# Patient Record
Sex: FEMALE | Race: WHITE | NOT HISPANIC OR LATINO | Employment: FULL TIME | URBAN - METROPOLITAN AREA
[De-identification: names, ages, dates, MRNs, and addresses within clinical notes are randomized per-mention and may not be internally consistent; named-entity substitution may affect disease eponyms.]

---

## 2023-08-15 ENCOUNTER — OFFICE VISIT (OUTPATIENT)
Dept: PRIMARY CARE | Facility: CLINIC | Age: 48
End: 2023-08-15
Payer: COMMERCIAL

## 2023-08-15 VITALS
SYSTOLIC BLOOD PRESSURE: 128 MMHG | DIASTOLIC BLOOD PRESSURE: 78 MMHG | WEIGHT: 137 LBS | HEART RATE: 73 BPM | BODY MASS INDEX: 22.62 KG/M2

## 2023-08-15 DIAGNOSIS — G57.02 PIRIFORMIS SYNDROME OF LEFT SIDE: ICD-10-CM

## 2023-08-15 DIAGNOSIS — M25.551 RIGHT HIP PAIN: Primary | ICD-10-CM

## 2023-08-15 PROBLEM — J32.9 RECURRENT SINUS INFECTIONS: Status: ACTIVE | Noted: 2023-08-15

## 2023-08-15 PROBLEM — R30.0 DYSURIA: Status: ACTIVE | Noted: 2023-08-15

## 2023-08-15 PROBLEM — D18.02 CAVERNOUS HEMANGIOMA OF BRAIN (MULTI): Status: ACTIVE | Noted: 2023-08-15

## 2023-08-15 PROBLEM — J34.2 NASAL SEPTAL DEVIATION: Status: ACTIVE | Noted: 2023-08-15

## 2023-08-15 PROBLEM — U07.1 COVID-19: Status: ACTIVE | Noted: 2023-08-15

## 2023-08-15 PROBLEM — R82.81 PYURIA: Status: ACTIVE | Noted: 2023-08-15

## 2023-08-15 PROBLEM — J34.89 NASAL OBSTRUCTION: Status: ACTIVE | Noted: 2023-08-15

## 2023-08-15 PROBLEM — E78.5 HYPERLIPIDEMIA: Status: ACTIVE | Noted: 2023-08-15

## 2023-08-15 PROBLEM — R09.81 NASAL CONGESTION: Status: ACTIVE | Noted: 2023-08-15

## 2023-08-15 PROBLEM — J34.89 NASAL DRAINAGE: Status: ACTIVE | Noted: 2023-08-15

## 2023-08-15 PROBLEM — H53.8 BLURRING, LEFT EYE: Status: ACTIVE | Noted: 2023-08-15

## 2023-08-15 PROBLEM — N30.91 HEMORRHAGIC CYSTITIS: Status: ACTIVE | Noted: 2023-08-15

## 2023-08-15 PROBLEM — J32.9 CHRONIC RHINOSINUSITIS: Status: ACTIVE | Noted: 2023-08-15

## 2023-08-15 PROBLEM — J34.3 HYPERTROPHY OF BOTH INFERIOR NASAL TURBINATES: Status: ACTIVE | Noted: 2023-08-15

## 2023-08-15 PROBLEM — H93.8X9 EAR FULLNESS: Status: ACTIVE | Noted: 2023-08-15

## 2023-08-15 PROBLEM — F41.9 ANXIETY: Status: ACTIVE | Noted: 2023-08-15

## 2023-08-15 PROBLEM — M54.2 NECK PAIN ON RIGHT SIDE: Status: ACTIVE | Noted: 2023-08-15

## 2023-08-15 PROBLEM — R51.9 HEADACHE: Status: ACTIVE | Noted: 2023-08-15

## 2023-08-15 PROBLEM — J35.8 TONSILLOLITH: Status: ACTIVE | Noted: 2023-08-15

## 2023-08-15 PROBLEM — R53.83 FATIGUE: Status: ACTIVE | Noted: 2023-08-15

## 2023-08-15 PROBLEM — J30.9 ALLERGIC RHINITIS: Status: ACTIVE | Noted: 2023-08-15

## 2023-08-15 PROCEDURE — 99214 OFFICE O/P EST MOD 30 MIN: CPT | Performed by: FAMILY MEDICINE

## 2023-08-15 PROCEDURE — 1036F TOBACCO NON-USER: CPT | Performed by: FAMILY MEDICINE

## 2023-08-15 RX ORDER — NORETHINDRONE ACETATE AND ETHINYL ESTRADIOL, ETHINYL ESTRADIOL AND FERROUS FUMARATE 1MG-10(24)
1 KIT ORAL DAILY
COMMUNITY

## 2023-08-15 RX ORDER — TOPIRAMATE 200 MG/1
200 TABLET ORAL 2 TIMES DAILY
COMMUNITY

## 2023-08-15 RX ORDER — SOD CHLOR,BICARB/SQUEEZ BOTTLE
PACKET, WITH RINSE DEVICE NASAL
COMMUNITY
Start: 2022-04-13

## 2023-08-15 RX ORDER — HYDROCORTISONE ACETATE 25 MG/1
SUPPOSITORY RECTAL
COMMUNITY
End: 2023-09-18

## 2023-08-15 RX ORDER — SERTRALINE HYDROCHLORIDE 50 MG/1
50 TABLET, FILM COATED ORAL DAILY
COMMUNITY
End: 2024-01-22

## 2023-08-15 RX ORDER — FLUTICASONE PROPIONATE 50 MCG
SPRAY, SUSPENSION (ML) NASAL
COMMUNITY
Start: 2017-06-16

## 2023-08-15 RX ORDER — CETIRIZINE HYDROCHLORIDE 10 MG/1
1 TABLET ORAL DAILY
COMMUNITY
Start: 2014-03-20

## 2023-08-15 RX ORDER — OMEPRAZOLE 20 MG/1
20 CAPSULE, DELAYED RELEASE ORAL DAILY
COMMUNITY
End: 2023-12-07 | Stop reason: SDUPTHER

## 2023-08-15 RX ORDER — PHENAZOPYRIDINE HYDROCHLORIDE 100 MG/1
TABLET, FILM COATED ORAL
COMMUNITY
Start: 2022-05-20

## 2023-08-15 RX ORDER — CYCLOBENZAPRINE HCL 5 MG
5 TABLET ORAL NIGHTLY PRN
Qty: 30 TABLET | Refills: 2 | Status: SHIPPED | OUTPATIENT
Start: 2023-08-15 | End: 2024-02-19

## 2023-08-15 ASSESSMENT — ENCOUNTER SYMPTOMS
COLOR CHANGE: 0
PALPITATIONS: 0
FATIGUE: 0
BACK PAIN: 0
APPETITE CHANGE: 0
COUGH: 0
FACIAL ASYMMETRY: 0
HEADACHES: 0
DIZZINESS: 0
ARTHRALGIAS: 0
LIGHT-HEADEDNESS: 0
ACTIVITY CHANGE: 0
FEVER: 0
CHEST TIGHTNESS: 0
CHOKING: 0

## 2023-08-15 NOTE — PROGRESS NOTES
Subjective   Patient ID: Lisa Alvarado is a 48 y.o. female who presents for Right hip pain. X May. No injury.     HPI   Patient explains that she has been having some difficulty with some right hip and back pain since May.  She reports that she has been doing a lot of digging in the yard and other things with yard work.  She explains her dog jumped on the spot which which exacerbated the entire situation.    Review of Systems   Constitutional:  Negative for activity change, appetite change, fatigue and fever.   HENT:  Negative for congestion.    Respiratory:  Negative for cough, choking and chest tightness.    Cardiovascular:  Negative for chest pain, palpitations and leg swelling.   Musculoskeletal:  Negative for arthralgias, back pain and gait problem.   Skin:  Negative for color change and pallor.   Neurological:  Negative for dizziness, facial asymmetry, light-headedness and headaches.       Objective   /78 (BP Location: Left arm)   Pulse 73   Wt 62.1 kg (137 lb)   BMI 22.62 kg/m²   BSA Body surface area is 1.69 meters squared.      Physical Exam  Constitutional:       General: She is not in acute distress.     Appearance: Normal appearance. She is not toxic-appearing.   HENT:      Head: Normocephalic.      Right Ear: Tympanic membrane, ear canal and external ear normal.      Left Ear: Tympanic membrane, ear canal and external ear normal.   Eyes:      Conjunctiva/sclera: Conjunctivae normal.      Pupils: Pupils are equal, round, and reactive to light.   Cardiovascular:      Rate and Rhythm: Normal rate and regular rhythm.      Pulses: Normal pulses.      Heart sounds: Normal heart sounds.   Pulmonary:      Effort: No respiratory distress.      Breath sounds: No wheezing, rhonchi or rales.   Musculoskeletal:         General: Swelling and tenderness present.      Cervical back: No tenderness.      Comments: Pain to posterior right hip at PS[facility] there is slight pain with flexion and extension of right hip    Skin:     Findings: No lesion or rash.   Neurological:      General: No focal deficit present.      Mental Status: She is alert and oriented to person, place, and time. Mental status is at baseline.      Gait: Gait normal.   Psychiatric:         Mood and Affect: Mood normal.         Behavior: Behavior normal.         Thought Content: Thought content normal.         Judgment: Judgment normal.       [facility]acy Encounter on 01/14/2023   Component Date Value Ref Range Status    Total Protein 01/14/2023 6.7  6.4 - 8.2 g/dL Final    Albumin 01/14/2023 4.3  3.4 - 5.0 g/dL Final    Alpha 1 01/14/2023 0.3  0.2 - 0.6 g/dL Final    Alpha 2 01/14/2023 0.7  0.4 - 1.1 g/dL Final    Beta 01/14/2023 0.8  0.5 - 1.2 g/dL Final    Gamma 01/14/2023 0.7  0.5 - 1.4 g/dL Final    SPE Interpretation 01/14/2023 NORMAL   Final    Cholesterol 01/14/2023 251 (H)  0 - 199 mg/dL Final    Comment: .      AGE      DESIRABLE   BORDERLINE HIGH   HIGH     0-19 Y     0 - 169       170 - 199     >/= 200    20-24 Y     0 - 189       190 - 224     >/= 225         >24 Y     0 - 199       200 - 239     >/= 240   **All ranges are based on fasting samples. Specific   therapeutic targets will vary based on patient-specific   cardiac risk.  .   Pediatric guidelines reference:Pediatrics 2011, 128(S5).   Adult guidelines reference: NCEP ATPIII Guidelines,     NAOMI 2001, 258:2486-97  .   Venipuncture immediately after or during the    administration of Metamizole may lead to falsely   low results. Testing should be performed immediately   prior to Metamizole dosing.      HDL 01/14/2023 65.1  mg/dL Final    Comment: .      AGE      VERY LOW   LOW     NORMAL    HIGH       0-19 Y       < 35   < 40     40-45     ----    20-24 Y       ----   < 40       >45     ----      >24 Y       ----   < 40     40-60      >60  .      Cholesterol/HDL Ratio 01/14/2023 3.9   Final    Comment: REF VALUES  DESIRABLE  < 3.4  HIGH RISK  > 5.0      LDL 01/14/2023 161 (H)  0 - 99 mg/dL Final     Comment: .                           NEAR      BORD      AGE      DESIRABLE  OPTIMAL    HIGH     HIGH     VERY HIGH     0-19 Y     0 - 109     ---    110-129   >/= 130     ----    20-24 Y     0 - 119     ---    120-159   >/= 160     ----      >24 Y     0 -  99   100-129  130-159   160-189     >/=190  .      VLDL 01/14/2023 25  0 - 40 mg/dL Final    Triglycerides 01/14/2023 123  0 - 149 mg/dL Final    Comment: .      AGE      DESIRABLE   BORDERLINE HIGH   HIGH     VERY HIGH   0 D-90 D    19 - 174         ----         ----        ----  91 D- 9 Y     0 -  74        75 -  99     >/= 100      ----    10-19 Y     0 -  89        90 - 129     >/= 130      ----    20-24 Y     0 - 114       115 - 149     >/= 150      ----         >24 Y     0 - 149       150 - 199    200- 499    >/= 500  .   Venipuncture immediately after or during the    administration of Metamizole may lead to falsely   low results. Testing should be performed immediately   prior to Metamizole dosing.      Magnesium 01/14/2023 1.99  1.60 - 2.40 mg/dL Final    Vitamin D, 25-Hydroxy 01/14/2023 121 (A)  ng/mL Final    Comment: .  DEFICIENCY:         < 20   NG/ML  INSUFFICIENCY:      20-29  NG/ML  SUFFICIENCY:         NG/ML    THIS ASSAY ACCURATELY QUANTIFIES THE SUM OF  VITAMIN D3, 25-HYDROXY AND VIT D2,25-HYDROXY.      WBC 01/14/2023 7.0  4.4 - 11.3 x10E9/L Final    nRBC 01/14/2023 0.0  0.0 - 0.0 /100 WBC Final    RBC 01/14/2023 4.73  4.00 - 5.20 x10E12/L Final    Hemoglobin 01/14/2023 14.1  12.0 - 16.0 g/dL Final    Hematocrit 01/14/2023 45.1  36.0 - 46.0 % Final    MCV 01/14/2023 95  80 - 100 fL Final    MCHC 01/14/2023 31.3 (L)  32.0 - 36.0 g/dL Final    Platelets 01/14/2023 357  150 - 450 x10E9/L Final    RDW 01/14/2023 12.9  11.5 - 14.5 % Final    Neutrophils % 01/14/2023 51.4  40.0 - 80.0 % Final    Immature Granulocytes %, Automated 01/14/2023 0.6  0.0 - 0.9 % Final    Comment:  Immature Granulocyte Count (IG) includes promyelocytes,     myelocytes and metamyelocytes but does not include bands.   Percent differential counts (%) should be interpreted in the   context of the absolute cell counts (cells/L).      Lymphocytes % 01/14/2023 38.6  13.0 - 44.0 % Final    Monocytes % 01/14/2023 6.6  2.0 - 10.0 % Final    Eosinophils % 01/14/2023 1.9  0.0 - 6.0 % Final    Basophils % 01/14/2023 0.9  0.0 - 2.0 % Final    Neutrophils Absolute 01/14/2023 3.59  1.20 - 7.70 x10E9/L Final    Lymphocytes Absolute 01/14/2023 2.69  1.20 - 4.80 x10E9/L Final    Monocytes Absolute 01/14/2023 0.46  0.10 - 1.00 x10E9/L Final    Eosinophils Absolute 01/14/2023 0.13  0.00 - 0.70 x10E9/L Final    Basophils Absolute 01/14/2023 0.06  0.00 - 0.10 x10E9/L Final    Glucose 01/14/2023 77  74 - 99 mg/dL Final    Sodium 01/14/2023 141  136 - 145 mmol/L Final    Potassium 01/14/2023 4.4  3.5 - 5.3 mmol/L Final    Chloride 01/14/2023 111 (H)  98 - 107 mmol/L Final    Bicarbonate 01/14/2023 21  21 - 32 mmol/L Final    Anion Gap 01/14/2023 13  10 - 20 mmol/L Final    Urea Nitrogen 01/14/2023 13  6 - 23 mg/dL Final    Creatinine 01/14/2023 0.91  0.50 - 1.05 mg/dL Final    GFR Female 01/14/2023 78  >90 mL/min/1.73m2 Final    Comment:  CALCULATIONS OF ESTIMATED GFR ARE PERFORMED   USING THE 2021 CKD-EPI STUDY REFIT EQUATION   WITHOUT THE RACE VARIABLE FOR THE IDMS-TRACEABLE   CREATININE METHODS.    https://jasn.asnjournals.org/content/early/2021/09/22/ASN.3876179410      Calcium 01/14/2023 9.4  8.6 - 10.6 mg/dL Final    Albumin 01/14/2023 4.3  3.4 - 5.0 g/dL Final    Alkaline Phosphatase 01/14/2023 33  33 - 110 U/L Final    Total Protein 01/14/2023 6.7  6.4 - 8.2 g/dL Final    AST 01/14/2023 11  9 - 39 U/L Final    Total Bilirubin 01/14/2023 0.5  0.0 - 1.2 mg/dL Final    ALT (SGPT) 01/14/2023 9  7 - 45 U/L Final    Comment:  Patients treated with Sulfasalazine may generate    falsely decreased results for ALT.      TSH 01/14/2023 2.12  0.44 - 3.98 mIU/L Final    Comment:  TSH testing is  performed using different testing    methodology at [facility] than at other    [facility]. Direct result comparisons should    only be made within the same method.      Path Review - Serum Protein Electr* 01/14/2023 [place]   Final    Comment:  By her/his signature above, the Pathologist   listed as making the final interpretation   certifies that she/he has personally reviewed    this case.  ----------------------------------------------       LD 01/14/2023 97  84 - 246 U/L Final    Phosphorus 01/14/2023 3.8  2.5 - 4.9 mg/dL Final    Comment:  The performance characteristics of phosphorus testing in   heparinized plasma have been validated by the individual     laboratory site where testing is performed. Testing    on heparinized plasma is not approved by the FDA;    however, such approval is not necessary.       Current Outpatient Medications on File Prior to Visit   Medication Sig Dispense Refill    cetirizine (ZyrTEC) 10 mg tablet Take 1 tablet (10 mg) by mouth once daily.      fluticasone (Flonase) 50 mcg/actuation nasal spray Administer into affected nostril(s).      hydrocortisone (Anusol-HC) 25 mg suppository INSERT 1 SUPPOSITORY RECTALLY TWICE A DAY      Lo Loestrin Fe 1 mg-10 mcg (24)/10 mcg (2) tablet Take 1 tablet by mouth once daily.      omeprazole (PriLOSEC) 20 mg DR capsule Take 1 capsule (20 mg) by mouth once daily.      phenazopyridine (Pyridium) 100 mg tablet Take by mouth.      sertraline (Zoloft) 50 mg tablet Take 1 tablet (50 mg) by mouth once daily.      sod chloride-sodium bicarb (Sinus Rinse Starter) nasal rinse Administer into affected nostril(s).      topiramate (Topamax) 200 mg tablet Take 1 tablet (200 mg) by mouth 2 times a day.       No current facility-administered medications on file prior to visit.     No images are attached to the encounter.            Assessment/Plan   Diagnoses and all orders for this visit:  Right hip pain  -     XR pelvis 1-2 views;  Future  -     XR lumbar spine 2-3 views; Future  -     cyclobenzaprine (Flexeril) 5 mg tablet; Take 1 tablet (5 mg) by mouth as needed at bedtime for muscle spasms.  Piriformis syndrome of left side  -     XR pelvis 1-2 views; Future  -     XR lumbar spine 2-3 views; Future  -     cyclobenzaprine (Flexeril) 5 mg tablet; Take 1 tablet (5 mg) by mouth as needed at bedtime for muscle spasms.  Other orders  -     XR lumbar spine 2-3 views    1.  Patient have additional x-ray images  2.  Patient will trial Flexeril  3.  Patient to use heat to area 20 minutes on 20 minutes off  4.  Patient to call for questions or concerns

## 2023-08-17 ENCOUNTER — TELEPHONE (OUTPATIENT)
Dept: PRIMARY CARE | Facility: CLINIC | Age: 48
End: 2023-08-17
Payer: COMMERCIAL

## 2023-08-17 NOTE — TELEPHONE ENCOUNTER
Please call patient to see when she is going to be getting her x-rays performed, I have not seen the results

## 2023-08-18 ENCOUNTER — TELEPHONE (OUTPATIENT)
Dept: PRIMARY CARE | Facility: CLINIC | Age: 48
End: 2023-08-18
Payer: COMMERCIAL

## 2023-08-18 NOTE — TELEPHONE ENCOUNTER
Notified patient pelvic x-ray is normal, also has multiple areas of arthritis in her lumbar spine.     Patient wanted recommendation for a general surgeon for Hemorrhoids, Dr Martinez recommended Dr Casiano with  [phone].  Also would like recommendation for ortho specialist. Dr Martinez recommended Dr Saleem ewing.

## 2023-09-17 DIAGNOSIS — Z00.00 ENCOUNTER FOR GENERAL ADULT MEDICAL EXAMINATION WITHOUT ABNORMAL FINDINGS: ICD-10-CM

## 2023-09-18 RX ORDER — HYDROCORTISONE ACETATE 25 MG/1
SUPPOSITORY RECTAL
Qty: 30 SUPPOSITORY | Refills: 11 | Status: SHIPPED | OUTPATIENT
Start: 2023-09-18

## 2023-12-06 DIAGNOSIS — K21.9 GASTROESOPHAGEAL REFLUX DISEASE, UNSPECIFIED WHETHER ESOPHAGITIS PRESENT: Primary | ICD-10-CM

## 2023-12-07 RX ORDER — OMEPRAZOLE 20 MG/1
20 CAPSULE, DELAYED RELEASE ORAL DAILY
Qty: 90 CAPSULE | Refills: 3 | Status: SHIPPED | OUTPATIENT
Start: 2023-12-07

## 2024-01-19 ENCOUNTER — TELEPHONE (OUTPATIENT)
Dept: PRIMARY CARE | Facility: CLINIC | Age: 49
End: 2024-01-19
Payer: COMMERCIAL

## 2024-01-19 DIAGNOSIS — R30.0 DYSURIA: ICD-10-CM

## 2024-01-19 RX ORDER — SULFAMETHOXAZOLE AND TRIMETHOPRIM 800; 160 MG/1; MG/1
1 TABLET ORAL 2 TIMES DAILY
Qty: 14 TABLET | Refills: 0 | Status: SHIPPED | OUTPATIENT
Start: 2024-01-19 | End: 2024-01-26

## 2024-01-19 NOTE — TELEPHONE ENCOUNTER
Patient left a voicemail stating she has a uti, she is at work all day and is unable to leave. Requesting an antibiotic. Possibly Bactrim.

## 2024-01-20 DIAGNOSIS — F41.9 ANXIETY DISORDER, UNSPECIFIED: ICD-10-CM

## 2024-01-22 RX ORDER — SERTRALINE HYDROCHLORIDE 50 MG/1
50 TABLET, FILM COATED ORAL DAILY
Qty: 90 TABLET | Refills: 3 | Status: SHIPPED | OUTPATIENT
Start: 2024-01-22

## 2024-02-18 DIAGNOSIS — M25.551 RIGHT HIP PAIN: ICD-10-CM

## 2024-02-18 DIAGNOSIS — G57.02 PIRIFORMIS SYNDROME OF LEFT SIDE: ICD-10-CM

## 2024-02-19 RX ORDER — CYCLOBENZAPRINE HCL 5 MG
5 TABLET ORAL NIGHTLY PRN
Qty: 30 TABLET | Refills: 2 | Status: SHIPPED | OUTPATIENT
Start: 2024-02-19 | End: 2024-04-19

## 2024-06-12 ENCOUNTER — TELEMEDICINE (OUTPATIENT)
Dept: PRIMARY CARE | Facility: CLINIC | Age: 49
End: 2024-06-12
Payer: COMMERCIAL

## 2024-06-12 ENCOUNTER — TELEPHONE (OUTPATIENT)
Dept: PRIMARY CARE | Facility: CLINIC | Age: 49
End: 2024-06-12

## 2024-06-12 DIAGNOSIS — Z00.00 HEALTHCARE MAINTENANCE: ICD-10-CM

## 2024-06-12 DIAGNOSIS — R53.83 OTHER FATIGUE: ICD-10-CM

## 2024-06-12 DIAGNOSIS — F41.9 ANXIETY: Primary | ICD-10-CM

## 2024-06-12 DIAGNOSIS — M79.10 MYALGIA: ICD-10-CM

## 2024-06-12 DIAGNOSIS — Z12.31 SCREENING MAMMOGRAM FOR BREAST CANCER: ICD-10-CM

## 2024-06-12 PROCEDURE — 1036F TOBACCO NON-USER: CPT | Performed by: FAMILY MEDICINE

## 2024-06-12 PROCEDURE — 99214 OFFICE O/P EST MOD 30 MIN: CPT | Performed by: FAMILY MEDICINE

## 2024-06-12 ASSESSMENT — ENCOUNTER SYMPTOMS
FEVER: 0
COUGH: 0
LIGHT-HEADEDNESS: 0
COLOR CHANGE: 0
BACK PAIN: 0
FACIAL ASYMMETRY: 0
CHEST TIGHTNESS: 0
PALPITATIONS: 0
APPETITE CHANGE: 0
CHOKING: 0
ARTHRALGIAS: 1
HEADACHES: 0
MYALGIAS: 1
DIZZINESS: 0
FATIGUE: 0
ACTIVITY CHANGE: 0

## 2024-06-12 NOTE — PROGRESS NOTES
Subjective   Patient ID: Lisa Alvarado is a 49 y.o. female who presents for To discuss medications. .    HPI   Patient wishes to discuss her medications. She thins she might have fibromyalgia. She is doing well with topamax for her migraines. She does not sleep very well.       Review of Systems   Constitutional:  Negative for activity change, appetite change, fatigue and fever.   HENT:  Negative for congestion.    Respiratory:  Negative for cough, choking and chest tightness.    Cardiovascular:  Negative for chest pain, palpitations and leg swelling.   Musculoskeletal:  Positive for arthralgias and myalgias. Negative for back pain and gait problem.   Skin:  Negative for color change and pallor.   Neurological:  Negative for dizziness, facial asymmetry, light-headedness and headaches.       Objective   There were no vitals taken for this visit.  BSA There is no height or weight on file to calculate BSA.      Physical Exam  Constitutional:       Appearance: Normal appearance.   Neurological:      Mental Status: She is alert.       No visits with results within 1 Year(s) from this visit.   Latest known visit with results is:   Legacy Encounter on 01/14/2023   Component Date Value Ref Range Status    Total Protein 01/14/2023 6.7  6.4 - 8.2 g/dL Final    Albumin 01/14/2023 4.3  3.4 - 5.0 g/dL Final    Alpha 1 01/14/2023 0.3  0.2 - 0.6 g/dL Final    Alpha 2 01/14/2023 0.7  0.4 - 1.1 g/dL Final    Beta 01/14/2023 0.8  0.5 - 1.2 g/dL Final    Gamma 01/14/2023 0.7  0.5 - 1.4 g/dL Final    SPE Interpretation 01/14/2023 NORMAL   Final    Cholesterol 01/14/2023 251 (H)  0 - 199 mg/dL Final    Comment: .      AGE      DESIRABLE   BORDERLINE HIGH   HIGH     0-19 Y     0 - 169       170 - 199     >/= 200    20-24 Y     0 - 189       190 - 224     >/= 225         >24 Y     0 - 199       200 - 239     >/= 240   **All ranges are based on fasting samples. Specific   therapeutic targets will vary based on patient-specific   cardiac  risk.  .   Pediatric guidelines reference:Pediatrics 2011, 128(S5).   Adult guidelines reference: NCEP ATPIII Guidelines,     NAOMI 2001, 258:2486-97  .   Venipuncture immediately after or during the    administration of Metamizole may lead to falsely   low results. Testing should be performed immediately   prior to Metamizole dosing.      HDL 01/14/2023 65.1  mg/dL Final    Comment: .      AGE      VERY LOW   LOW     NORMAL    HIGH       0-19 Y       < 35   < 40     40-45     ----    20-24 Y       ----   < 40       >45     ----      >24 Y       ----   < 40     40-60      >60  .      Cholesterol/HDL Ratio 01/14/2023 3.9   Final    Comment: REF VALUES  DESIRABLE  < 3.4  HIGH RISK  > 5.0      LDL 01/14/2023 161 (H)  0 - 99 mg/dL Final    Comment: .                           NEAR      BORD      AGE      DESIRABLE  OPTIMAL    HIGH     HIGH     VERY HIGH     0-19 Y     0 - 109     ---    110-129   >/= 130     ----    20-24 Y     0 - 119     ---    120-159   >/= 160     ----      >24 Y     0 -  99   100-129  130-159   160-189     >/=190  .      VLDL 01/14/2023 25  0 - 40 mg/dL Final    Triglycerides 01/14/2023 123  0 - 149 mg/dL Final    Comment: .      AGE      DESIRABLE   BORDERLINE HIGH   HIGH     VERY HIGH   0 D-90 D    19 - 174         ----         ----        ----  91 D- 9 Y     0 -  74        75 -  99     >/= 100      ----    10-19 Y     0 -  89        90 - 129     >/= 130      ----    20-24 Y     0 - 114       115 - 149     >/= 150      ----         >24 Y     0 - 149       150 - 199    200- 499    >/= 500  .   Venipuncture immediately after or during the    administration of Metamizole may lead to falsely   low results. Testing should be performed immediately   prior to Metamizole dosing.      Magnesium 01/14/2023 1.99  1.60 - 2.40 mg/dL Final    Vitamin D, 25-Hydroxy 01/14/2023 121 (A)  ng/mL Final    Comment: .  DEFICIENCY:         < 20   NG/ML  INSUFFICIENCY:      20-29  NG/ML  SUFFICIENCY:          NG/ML    THIS ASSAY ACCURATELY QUANTIFIES THE SUM OF  VITAMIN D3, 25-HYDROXY AND VIT D2,25-HYDROXY.      WBC 01/14/2023 7.0  4.4 - 11.3 x10E9/L Final    nRBC 01/14/2023 0.0  0.0 - 0.0 /100 WBC Final    RBC 01/14/2023 4.73  4.00 - 5.20 x10E12/L Final    Hemoglobin 01/14/2023 14.1  12.0 - 16.0 g/dL Final    Hematocrit 01/14/2023 45.1  36.0 - 46.0 % Final    MCV 01/14/2023 95  80 - 100 fL Final    MCHC 01/14/2023 31.3 (L)  32.0 - 36.0 g/dL Final    Platelets 01/14/2023 357  150 - 450 x10E9/L Final    RDW 01/14/2023 12.9  11.5 - 14.5 % Final    Neutrophils % 01/14/2023 51.4  40.0 - 80.0 % Final    Immature Granulocytes %, Automated 01/14/2023 0.6  0.0 - 0.9 % Final    Comment:  Immature Granulocyte Count (IG) includes promyelocytes,    myelocytes and metamyelocytes but does not include bands.   Percent differential counts (%) should be interpreted in the   context of the absolute cell counts (cells/L).      Lymphocytes % 01/14/2023 38.6  13.0 - 44.0 % Final    Monocytes % 01/14/2023 6.6  2.0 - 10.0 % Final    Eosinophils % 01/14/2023 1.9  0.0 - 6.0 % Final    Basophils % 01/14/2023 0.9  0.0 - 2.0 % Final    Neutrophils Absolute 01/14/2023 3.59  1.20 - 7.70 x10E9/L Final    Lymphocytes Absolute 01/14/2023 2.69  1.20 - 4.80 x10E9/L Final    Monocytes Absolute 01/14/2023 0.46  0.10 - 1.00 x10E9/L Final    Eosinophils Absolute 01/14/2023 0.13  0.00 - 0.70 x10E9/L Final    Basophils Absolute 01/14/2023 0.06  0.00 - 0.10 x10E9/L Final    Glucose 01/14/2023 77  74 - 99 mg/dL Final    Sodium 01/14/2023 141  136 - 145 mmol/L Final    Potassium 01/14/2023 4.4  3.5 - 5.3 mmol/L Final    Chloride 01/14/2023 111 (H)  98 - 107 mmol/L Final    Bicarbonate 01/14/2023 21  21 - 32 mmol/L Final    Anion Gap 01/14/2023 13  10 - 20 mmol/L Final    Urea Nitrogen 01/14/2023 13  6 - 23 mg/dL Final    Creatinine 01/14/2023 0.91  0.50 - 1.05 mg/dL Final    GFR Female 01/14/2023 78  >90 mL/min/1.73m2 Final    Comment:  CALCULATIONS OF ESTIMATED  GFR ARE PERFORMED   USING THE 2021 CKD-EPI STUDY REFIT EQUATION   WITHOUT THE RACE VARIABLE FOR THE IDMS-TRACEABLE   CREATININE METHODS.    https://jasn.asnjournals.org/content/early/2021/09/22/ASN.7276546512      Calcium 01/14/2023 9.4  8.6 - 10.6 mg/dL Final    Albumin 01/14/2023 4.3  3.4 - 5.0 g/dL Final    Alkaline Phosphatase 01/14/2023 33  33 - 110 U/L Final    Total Protein 01/14/2023 6.7  6.4 - 8.2 g/dL Final    AST 01/14/2023 11  9 - 39 U/L Final    Total Bilirubin 01/14/2023 0.5  0.0 - 1.2 mg/dL Final    ALT (SGPT) 01/14/2023 9  7 - 45 U/L Final    Comment:  Patients treated with Sulfasalazine may generate    falsely decreased results for ALT.      TSH 01/14/2023 2.12  0.44 - 3.98 mIU/L Final    Comment:  TSH testing is performed using different testing    methodology at [facility] than at other    [facility]. Direct result comparisons should    only be made within the same method.      Path Review - Serum Protein Electr* 01/14/2023 [place]   Final    Comment:  By her/his signature above, the Pathologist   listed as making the final interpretation   certifies that she/he has personally reviewed    this case.  ----------------------------------------------       LD 01/14/2023 97  84 - 246 U/L Final    Phosphorus 01/14/2023 3.8  2.5 - 4.9 mg/dL Final    Comment:  The performance characteristics of phosphorus testing in   heparinized plasma have been validated by the individual     laboratory site where testing is performed. Testing    on heparinized plasma is not approved by the FDA;    however, such approval is not necessary.       Current Outpatient Medications on File Prior to Visit   Medication Sig Dispense Refill    cetirizine (ZyrTEC) 10 mg tablet Take 1 tablet (10 mg) by mouth once daily.      fluticasone (Flonase) 50 mcg/actuation nasal spray Administer into affected nostril(s).      hydrocortisone (Anusol-HC) 25 mg suppository INSERT 1 SUPPOSITORY RECTALLY TWICE A DAY 30  suppository 11    Lo Loestrin Fe 1 mg-10 mcg (24)/10 mcg (2) tablet Take 1 tablet by mouth once daily.      omeprazole (PriLOSEC) 20 mg DR capsule TAKE 1 CAPSULE BY MOUTH EVERY DAY 90 capsule 3    phenazopyridine (Pyridium) 100 mg tablet Take by mouth.      sertraline (Zoloft) 50 mg tablet TAKE 1 TABLET BY MOUTH EVERY DAY 90 tablet 3    sod chloride-sodium bicarb (Sinus Rinse Starter) nasal rinse Administer into affected nostril(s).      topiramate (Topamax) 200 mg tablet Take 1 tablet (200 mg) by mouth 2 times a day.       No current facility-administered medications on file prior to visit.     No images are attached to the encounter.            Assessment/Plan   Diagnoses and all orders for this visit:  Anxiety    Patient to have additional blood work performed  Patient to continue on her zoloft  Patient to call if questions or concerns

## 2024-06-25 ENCOUNTER — LAB (OUTPATIENT)
Dept: LAB | Facility: LAB | Age: 49
End: 2024-06-25
Payer: COMMERCIAL

## 2024-06-25 DIAGNOSIS — M79.10 MYALGIA: ICD-10-CM

## 2024-06-25 DIAGNOSIS — Z00.00 HEALTHCARE MAINTENANCE: ICD-10-CM

## 2024-06-25 DIAGNOSIS — R53.83 OTHER FATIGUE: ICD-10-CM

## 2024-06-25 DIAGNOSIS — F41.9 ANXIETY: ICD-10-CM

## 2024-06-25 LAB
25(OH)D3 SERPL-MCNC: 79 NG/ML (ref 30–100)
ALBUMIN SERPL BCP-MCNC: 4.2 G/DL (ref 3.4–5)
ALP SERPL-CCNC: 36 U/L (ref 33–110)
ALT SERPL W P-5'-P-CCNC: 9 U/L (ref 7–45)
ANION GAP SERPL CALC-SCNC: 13 MMOL/L (ref 10–20)
AST SERPL W P-5'-P-CCNC: 13 U/L (ref 9–39)
BILIRUB SERPL-MCNC: 0.3 MG/DL (ref 0–1.2)
BUN SERPL-MCNC: 17 MG/DL (ref 6–23)
CALCIUM SERPL-MCNC: 9.4 MG/DL (ref 8.6–10.6)
CCP IGG SERPL-ACNC: <1 U/ML
CHLORIDE SERPL-SCNC: 110 MMOL/L (ref 98–107)
CHOLEST SERPL-MCNC: 248 MG/DL (ref 0–199)
CHOLESTEROL/HDL RATIO: 3.7
CO2 SERPL-SCNC: 22 MMOL/L (ref 21–32)
CREAT SERPL-MCNC: 0.97 MG/DL (ref 0.5–1.05)
CRP SERPL-MCNC: 0.13 MG/DL
EGFRCR SERPLBLD CKD-EPI 2021: 72 ML/MIN/1.73M*2
GLUCOSE SERPL-MCNC: 90 MG/DL (ref 74–99)
HDLC SERPL-MCNC: 67.2 MG/DL
LDLC SERPL CALC-MCNC: 153 MG/DL
NON HDL CHOLESTEROL: 181 MG/DL (ref 0–149)
POTASSIUM SERPL-SCNC: 4.2 MMOL/L (ref 3.5–5.3)
PROT SERPL-MCNC: 6.5 G/DL (ref 6.4–8.2)
RHEUMATOID FACT SER NEPH-ACNC: <10 IU/ML (ref 0–15)
SODIUM SERPL-SCNC: 141 MMOL/L (ref 136–145)
TRIGL SERPL-MCNC: 139 MG/DL (ref 0–149)
TSH SERPL-ACNC: 3.28 MIU/L (ref 0.44–3.98)
URATE SERPL-MCNC: 3.3 MG/DL (ref 2.3–6.7)
VLDL: 28 MG/DL (ref 0–40)

## 2024-06-25 PROCEDURE — 80053 COMPREHEN METABOLIC PANEL: CPT

## 2024-06-25 PROCEDURE — 86431 RHEUMATOID FACTOR QUANT: CPT

## 2024-06-25 PROCEDURE — 84550 ASSAY OF BLOOD/URIC ACID: CPT

## 2024-06-25 PROCEDURE — 86200 CCP ANTIBODY: CPT

## 2024-06-25 PROCEDURE — 84443 ASSAY THYROID STIM HORMONE: CPT

## 2024-06-25 PROCEDURE — 80061 LIPID PANEL: CPT

## 2024-06-25 PROCEDURE — 86140 C-REACTIVE PROTEIN: CPT

## 2024-06-25 PROCEDURE — 85025 COMPLETE CBC W/AUTO DIFF WBC: CPT

## 2024-06-25 PROCEDURE — 82306 VITAMIN D 25 HYDROXY: CPT

## 2024-06-25 PROCEDURE — 85652 RBC SED RATE AUTOMATED: CPT

## 2024-06-25 PROCEDURE — 36415 COLL VENOUS BLD VENIPUNCTURE: CPT

## 2024-06-25 PROCEDURE — 86038 ANTINUCLEAR ANTIBODIES: CPT

## 2024-06-26 LAB
BASOPHILS # BLD AUTO: 0.05 X10*3/UL (ref 0–0.1)
BASOPHILS NFR BLD AUTO: 0.8 %
EOSINOPHIL # BLD AUTO: 0.22 X10*3/UL (ref 0–0.7)
EOSINOPHIL NFR BLD AUTO: 3.4 %
ERYTHROCYTE [DISTWIDTH] IN BLOOD BY AUTOMATED COUNT: 13.5 % (ref 11.5–14.5)
ERYTHROCYTE [SEDIMENTATION RATE] IN BLOOD BY WESTERGREN METHOD: 5 MM/H (ref 0–20)
HCT VFR BLD AUTO: 43.3 % (ref 36–46)
HGB BLD-MCNC: 13.4 G/DL (ref 12–16)
IMM GRANULOCYTES # BLD AUTO: 0.05 X10*3/UL (ref 0–0.7)
IMM GRANULOCYTES NFR BLD AUTO: 0.8 % (ref 0–0.9)
LYMPHOCYTES # BLD AUTO: 2.45 X10*3/UL (ref 1.2–4.8)
LYMPHOCYTES NFR BLD AUTO: 38.3 %
MCH RBC QN AUTO: 31 PG (ref 26–34)
MCHC RBC AUTO-ENTMCNC: 30.9 G/DL (ref 32–36)
MCV RBC AUTO: 100 FL (ref 80–100)
MONOCYTES # BLD AUTO: 0.45 X10*3/UL (ref 0.1–1)
MONOCYTES NFR BLD AUTO: 7 %
NEUTROPHILS # BLD AUTO: 3.18 X10*3/UL (ref 1.2–7.7)
NEUTROPHILS NFR BLD AUTO: 49.7 %
NRBC BLD-RTO: 0 /100 WBCS (ref 0–0)
PLATELET # BLD AUTO: 293 X10*3/UL (ref 150–450)
RBC # BLD AUTO: 4.32 X10*6/UL (ref 4–5.2)
WBC # BLD AUTO: 6.4 X10*3/UL (ref 4.4–11.3)

## 2024-06-27 LAB — ANA SER QL HEP2 SUBST: NEGATIVE

## 2024-07-18 ENCOUNTER — HOSPITAL ENCOUNTER (OUTPATIENT)
Dept: RADIOLOGY | Facility: CLINIC | Age: 49
Discharge: HOME | End: 2024-07-18
Payer: COMMERCIAL

## 2024-07-18 VITALS — HEIGHT: 65 IN | BODY MASS INDEX: 23.82 KG/M2 | WEIGHT: 143 LBS

## 2024-07-18 DIAGNOSIS — Z12.31 SCREENING MAMMOGRAM FOR BREAST CANCER: ICD-10-CM

## 2024-07-18 PROCEDURE — 77063 BREAST TOMOSYNTHESIS BI: CPT

## 2024-09-13 ENCOUNTER — TELEMEDICINE (OUTPATIENT)
Dept: PRIMARY CARE | Facility: CLINIC | Age: 49
End: 2024-09-13
Payer: COMMERCIAL

## 2024-09-13 DIAGNOSIS — J01.10 ACUTE FRONTAL SINUSITIS, RECURRENCE NOT SPECIFIED: Primary | ICD-10-CM

## 2024-09-13 PROCEDURE — 99213 OFFICE O/P EST LOW 20 MIN: CPT | Performed by: STUDENT IN AN ORGANIZED HEALTH CARE EDUCATION/TRAINING PROGRAM

## 2024-09-13 RX ORDER — METHYLPREDNISOLONE 4 MG/1
TABLET ORAL
Qty: 21 TABLET | Refills: 0 | Status: SHIPPED | OUTPATIENT
Start: 2024-09-13 | End: 2024-09-20

## 2024-09-13 RX ORDER — AMOXICILLIN AND CLAVULANATE POTASSIUM 875; 125 MG/1; MG/1
875 TABLET, FILM COATED ORAL 2 TIMES DAILY
Qty: 14 TABLET | Refills: 0 | Status: SHIPPED | OUTPATIENT
Start: 2024-09-13 | End: 2024-09-20

## 2024-09-13 ASSESSMENT — ENCOUNTER SYMPTOMS
CHILLS: 0
SINUS PAIN: 1
SINUS PRESSURE: 1
FEVER: 0
COUGH: 0
HEADACHES: 1
SHORTNESS OF BREATH: 0
RHINORRHEA: 0
ABDOMINAL PAIN: 0
FATIGUE: 0

## 2024-09-13 NOTE — PROGRESS NOTES
Subjective   Patient ID: Lisa Alvarado is a 49 y.o. female who presents for Sinusitis (X 1 week).    Sinusitis  Associated symptoms include congestion, headaches (sinus) and sinus pressure. Pertinent negatives include no chills, coughing or shortness of breath.        Symptoms started about a week ago.  She has not had a bad sinus infection in a few years. She uses regular sinus irrigation.  She had a left over Bactrim and started taking this about a couple of days ago.  She states that she ran out of this medication and is already completed it but it did not seem to help with any of her symptoms.  She has not been any other over-the-counter medications other than the sinus irrigation.      Review of Systems   Constitutional:  Negative for chills, fatigue and fever.   HENT:  Positive for congestion, sinus pressure and sinus pain. Negative for rhinorrhea.    Respiratory:  Negative for cough and shortness of breath.    Cardiovascular:  Negative for chest pain.   Gastrointestinal:  Negative for abdominal pain.   Neurological:  Positive for headaches (sinus).       Objective   There were no vitals taken for this visit.    Physical Exam    Assessment/Plan   Problem List Items Addressed This Visit    None  Visit Diagnoses         Codes    Acute frontal sinusitis, recurrence not specified    -  Primary J01.10    Relevant Medications    amoxicillin-pot clavulanate (Augmentin) 875-125 mg tablet    methylPREDNISolone (Medrol Dospak) 4 mg tablets          History as above.  Patient presenting with symptoms consistent with a sinusitis that has been ongoing.  She started taking some leftover Bactrim that she had at home without any effect.  She has had this in the past before and needed both antibiotics as well as steroids.  Sent in a course of Augmentin for the patient.  Also sent in a course of a Medrol Dosepak should the patient's symptoms continue to worsen over the weekend.  Discussed signs and symptoms that would require  reevaluation in the office.  She is understanding and in agreement with this plan    This visit was completed via telemedicine (video) call due to the restrictions of the COVID global pandemic. The visit was conducted between Lisa Alvarado, location [place]. and myself, Todd Khoury DO, location [place]. The patient verbally consented to the telehealth visit and verbally confirmed their location. All issues were discussed and addressed as in the clinical documentation, but no physical exam was performed. If it was felt that the patient should be evaluated in a clinical setting, then they were directed there.  Spent 11:48 minutes with the patient over the telemedicine call and more than 50% of this time was spent in counseling and coordination of care.

## 2024-09-20 DIAGNOSIS — Z00.00 ENCOUNTER FOR GENERAL ADULT MEDICAL EXAMINATION WITHOUT ABNORMAL FINDINGS: ICD-10-CM

## 2024-09-21 RX ORDER — HYDROCORTISONE ACETATE 25 MG/1
SUPPOSITORY RECTAL
Qty: 30 SUPPOSITORY | Refills: 11 | Status: SHIPPED | OUTPATIENT
Start: 2024-09-21

## 2024-10-27 DIAGNOSIS — M25.551 RIGHT HIP PAIN: ICD-10-CM

## 2024-10-27 DIAGNOSIS — G57.02 PIRIFORMIS SYNDROME OF LEFT SIDE: ICD-10-CM

## 2024-10-28 RX ORDER — CYCLOBENZAPRINE HCL 5 MG
5 TABLET ORAL NIGHTLY PRN
Qty: 30 TABLET | Refills: 2 | Status: SHIPPED | OUTPATIENT
Start: 2024-10-28 | End: 2024-12-27

## 2024-12-17 ENCOUNTER — APPOINTMENT (OUTPATIENT)
Dept: PRIMARY CARE | Facility: CLINIC | Age: 49
End: 2024-12-17
Payer: COMMERCIAL

## 2024-12-17 ENCOUNTER — HOSPITAL ENCOUNTER (OUTPATIENT)
Dept: RADIOLOGY | Facility: CLINIC | Age: 49
Discharge: HOME | End: 2024-12-17
Payer: COMMERCIAL

## 2024-12-17 VITALS
WEIGHT: 149 LBS | HEART RATE: 77 BPM | SYSTOLIC BLOOD PRESSURE: 120 MMHG | DIASTOLIC BLOOD PRESSURE: 78 MMHG | BODY MASS INDEX: 24.62 KG/M2 | OXYGEN SATURATION: 99 %

## 2024-12-17 DIAGNOSIS — R07.9 CHEST PAIN, UNSPECIFIED TYPE: Primary | ICD-10-CM

## 2024-12-17 DIAGNOSIS — R00.2 PALPITATIONS: ICD-10-CM

## 2024-12-17 DIAGNOSIS — R07.9 CHEST PAIN, UNSPECIFIED TYPE: ICD-10-CM

## 2024-12-17 PROCEDURE — 93000 ELECTROCARDIOGRAM COMPLETE: CPT | Performed by: FAMILY MEDICINE

## 2024-12-17 PROCEDURE — 71046 X-RAY EXAM CHEST 2 VIEWS: CPT

## 2024-12-17 PROCEDURE — 71046 X-RAY EXAM CHEST 2 VIEWS: CPT | Performed by: RADIOLOGY

## 2024-12-17 PROCEDURE — 1036F TOBACCO NON-USER: CPT | Performed by: FAMILY MEDICINE

## 2024-12-17 PROCEDURE — 99214 OFFICE O/P EST MOD 30 MIN: CPT | Performed by: FAMILY MEDICINE

## 2024-12-17 ASSESSMENT — ENCOUNTER SYMPTOMS
PALPITATIONS: 1
FATIGUE: 0
ARTHRALGIAS: 0
ACTIVITY CHANGE: 0
ABDOMINAL DISTENTION: 0
COUGH: 0
CHOKING: 0
FACIAL ASYMMETRY: 0
ABDOMINAL PAIN: 0
CONSTIPATION: 1
COLOR CHANGE: 0
FEVER: 0
DIZZINESS: 0
BACK PAIN: 0
APPETITE CHANGE: 0
LIGHT-HEADEDNESS: 0
HEADACHES: 0
DIARRHEA: 0
CHEST TIGHTNESS: 0

## 2024-12-17 NOTE — PROGRESS NOTES
Subjective   Patient ID: Lisa Alvarado is a 49 y.o. female who presents for Chest pain between breasts.  (Works its way down to stomach (indigestion) X 2 months ).    HPI   Reports she has been experiencing chest discomfort. She feels that she has some butterfly feeling in the chest. She does have a lot of stress with her daughter and son. She usually takes tylenol pm two at bedtime and two melatonin to sleep at night.     Review of Systems   Constitutional:  Negative for activity change, appetite change, fatigue and fever.   HENT:  Negative for congestion.    Respiratory:  Negative for cough, choking and chest tightness.    Cardiovascular:  Positive for chest pain and palpitations. Negative for leg swelling.   Gastrointestinal:  Positive for constipation. Negative for abdominal distention, abdominal pain and diarrhea.   Musculoskeletal:  Negative for arthralgias, back pain and gait problem.   Skin:  Negative for color change and pallor.   Neurological:  Negative for dizziness, facial asymmetry, light-headedness and headaches.       Objective   /78 (BP Location: Left arm, Patient Position: Sitting)   Pulse 77   Wt 67.6 kg (149 lb)   SpO2 99%   BMI 24.62 kg/m²   BSA Body surface area is 1.76 meters squared.      Physical Exam  Constitutional:       General: She is not in acute distress.     Appearance: Normal appearance. She is not toxic-appearing.   HENT:      Head: Normocephalic.      Right Ear: Tympanic membrane, ear canal and external ear normal.      Left Ear: Tympanic membrane, ear canal and external ear normal.   Eyes:      Conjunctiva/sclera: Conjunctivae normal.      Pupils: Pupils are equal, round, and reactive to light.   Cardiovascular:      Rate and Rhythm: Normal rate and regular rhythm.      Pulses: Normal pulses.      Heart sounds: Normal heart sounds.   Pulmonary:      Effort: No respiratory distress.      Breath sounds: No wheezing, rhonchi or rales.   Abdominal:      General: Bowel sounds  are normal. There is no distension.      Palpations: Abdomen is soft.      Tenderness: There is no abdominal tenderness.   Musculoskeletal:         General: No swelling or tenderness.      Cervical back: No tenderness.   Skin:     Findings: No lesion or rash.   Neurological:      General: No focal deficit present.      Mental Status: She is alert and oriented to person, place, and time. Mental status is at baseline.      Gait: Gait normal.   Psychiatric:         Mood and Affect: Mood normal.         Behavior: Behavior normal.         Thought Content: Thought content normal.         Judgment: Judgment normal.       Lab on 06/25/2024   Component Date Value Ref Range Status    Uric Acid 06/25/2024 3.3  2.3 - 6.7 mg/dL Final    Venipuncture immediately after or during the administration of Metamizole may lead to falsely low results. Testing should be performed immediately  prior to Metamizole dosing.    MARIELY 06/25/2024 Negative  Negative Final    The Antinuclear Antibody (MARIELY) test was performed using  indirect immunofluorescence assay with HEp-2 cells slide.    Citrulline Antibody, IgG 06/25/2024 <1  <3 U/mL Final    NEGATIVE < 3 U/ML  POSITIVE >=3 U/ML    C-Reactive Protein 06/25/2024 0.13  <1.00 mg/dL Final    Rheumatoid Factor 06/25/2024 <10  0 - 15 IU/mL Final    Sedimentation Rate 06/25/2024 5  0 - 20 mm/h Final    WBC 06/25/2024 6.4  4.4 - 11.3 x10*3/uL Final    nRBC 06/25/2024 0.0  0.0 - 0.0 /100 WBCs Final    RBC 06/25/2024 4.32  4.00 - 5.20 x10*6/uL Final    Hemoglobin 06/25/2024 13.4  12.0 - 16.0 g/dL Final    Hematocrit 06/25/2024 43.3  36.0 - 46.0 % Final    MCV 06/25/2024 100  80 - 100 fL Final    MCH 06/25/2024 31.0  26.0 - 34.0 pg Final    MCHC 06/25/2024 30.9 (L)  32.0 - 36.0 g/dL Final    RDW 06/25/2024 13.5  11.5 - 14.5 % Final    Platelets 06/25/2024 293  150 - 450 x10*3/uL Final    Neutrophils % 06/25/2024 49.7  40.0 - 80.0 % Final    Immature Granulocytes %, Automated 06/25/2024 0.8  0.0 - 0.9 %  Final    Immature Granulocyte Count (IG) includes promyelocytes, myelocytes and metamyelocytes but does not include bands. Percent differential counts (%) should be interpreted in the context of the absolute cell counts (cells/UL).    Lymphocytes % 06/25/2024 38.3  13.0 - 44.0 % Final    Monocytes % 06/25/2024 7.0  2.0 - 10.0 % Final    Eosinophils % 06/25/2024 3.4  0.0 - 6.0 % Final    Basophils % 06/25/2024 0.8  0.0 - 2.0 % Final    Neutrophils Absolute 06/25/2024 3.18  1.20 - 7.70 x10*3/uL Final    Percent differential counts (%) should be interpreted in the context of the absolute cell counts (cells/uL).    Immature Granulocytes Absolute, Au* 06/25/2024 0.05  0.00 - 0.70 x10*3/uL Final    Lymphocytes Absolute 06/25/2024 2.45  1.20 - 4.80 x10*3/uL Final    Monocytes Absolute 06/25/2024 0.45  0.10 - 1.00 x10*3/uL Final    Eosinophils Absolute 06/25/2024 0.22  0.00 - 0.70 x10*3/uL Final    Basophils Absolute 06/25/2024 0.05  0.00 - 0.10 x10*3/uL Final    Glucose 06/25/2024 90  74 - 99 mg/dL Final    Sodium 06/25/2024 141  136 - 145 mmol/L Final    Potassium 06/25/2024 4.2  3.5 - 5.3 mmol/L Final    Chloride 06/25/2024 110 (H)  98 - 107 mmol/L Final    Bicarbonate 06/25/2024 22  21 - 32 mmol/L Final    Anion Gap 06/25/2024 13  10 - 20 mmol/L Final    Urea Nitrogen 06/25/2024 17  6 - 23 mg/dL Final    Creatinine 06/25/2024 0.97  0.50 - 1.05 mg/dL Final    eGFR 06/25/2024 72  >60 mL/min/1.73m*2 Final    Calculations of estimated GFR are performed using the 2021 CKD-EPI Study Refit equation without the race variable for the IDMS-Traceable creatinine methods.  https://jasn.asnjournals.org/content/early/2021/09/22/ASN.2175714221    Calcium 06/25/2024 9.4  8.6 - 10.6 mg/dL Final    Albumin 06/25/2024 4.2  3.4 - 5.0 g/dL Final    Alkaline Phosphatase 06/25/2024 36  33 - 110 U/L Final    Total Protein 06/25/2024 6.5  6.4 - 8.2 g/dL Final    AST 06/25/2024 13  9 - 39 U/L Final    Bilirubin, Total 06/25/2024 0.3  0.0 - 1.2  mg/dL Final    ALT 06/25/2024 9  7 - 45 U/L Final    Patients treated with Sulfasalazine may generate falsely decreased results for ALT.    Cholesterol 06/25/2024 248 (H)  0 - 199 mg/dL Final          Age      Desirable   Borderline High   High     0-19 Y     0 - 169       170 - 199     >/= 200    20-24 Y     0 - 189       190 - 224     >/= 225         >24 Y     0 - 199       200 - 239     >/= 240   **All ranges are based on fasting samples. Specific   therapeutic targets will vary based on patient-specific   cardiac risk.    Pediatric guidelines reference:Pediatrics 2011, 128(S5).Adult guidelines reference: NCEP ATPIII Guidelines,NAOMI 2001, 258:2486-97    Venipuncture immediately after or during the administration of Metamizole may lead to falsely low results. Testing should be performed immediately prior to Metamizole dosing.    HDL-Cholesterol 06/25/2024 67.2  mg/dL Final      Age       Very Low   Low     Normal    High    0-19 Y    < 35      < 40     40-45     ----  20-24 Y    ----     < 40      >45      ----        >24 Y      ----     < 40     40-60      >60      Cholesterol/HDL Ratio 06/25/2024 3.7   Final      Ref Values  Desirable  < 3.4  High Risk  > 5.0    LDL Calculated 06/25/2024 153 (H)  <=99 mg/dL Final                                Near   Borderline      AGE      Desirable  Optimal    High     High     Very High     0-19 Y     0 - 109     ---    110-129   >/= 130     ----    20-24 Y     0 - 119     ---    120-159   >/= 160     ----      >24 Y     0 -  99   100-129  130-159   160-189     >/=190      VLDL 06/25/2024 28  0 - 40 mg/dL Final    Triglycerides 06/25/2024 139  0 - 149 mg/dL Final       Age         Desirable   Borderline High   High     Very High   0 D-90 D    19 - 174         ----         ----        ----  91 D- 9 Y     0 -  74        75 -  99     >/= 100      ----    10-19 Y     0 -  89        90 - 129     >/= 130      ----    20-24 Y     0 - 114       115 - 149     >/= 150      ----          >24 Y     0 - 149       150 - 199    200- 499    >/= 500    Venipuncture immediately after or during the administration of Metamizole may lead to falsely low results. Testing should be performed immediately prior to Metamizole dosing.    Non HDL Cholesterol 06/25/2024 181 (H)  0 - 149 mg/dL Final          Age       Desirable   Borderline High   High     Very High     0-19 Y     0 - 119       120 - 144     >/= 145    >/= 160    20-24 Y     0 - 149       150 - 189     >/= 190      ----         >24 Y    30 mg/dL above LDL Cholesterol goal      Thyroid Stimulating Hormone 06/25/2024 3.28  0.44 - 3.98 mIU/L Final    Vitamin D, 25-Hydroxy, Total 06/25/2024 79  30 - 100 ng/mL Final     Current Outpatient Medications on File Prior to Visit   Medication Sig Dispense Refill    cetirizine (ZyrTEC) 10 mg tablet Take 1 tablet (10 mg) by mouth once daily.      cyclobenzaprine (Flexeril) 5 mg tablet TAKE 1 TABLET (5 MG) BY MOUTH AS NEEDED AT BEDTIME FOR MUSCLE SPASMS. 30 tablet 2    fluticasone (Flonase) 50 mcg/actuation nasal spray Administer into affected nostril(s).      hydrocortisone (Anusol-HC) 25 mg suppository INSERT 1 SUPPOSITORY RECTALLY TWICE A DAY 30 suppository 11    Lo Loestrin Fe 1 mg-10 mcg (24)/10 mcg (2) tablet Take 1 tablet by mouth once daily.      omeprazole (PriLOSEC) 20 mg DR capsule TAKE 1 CAPSULE BY MOUTH EVERY DAY 90 capsule 3    phenazopyridine (Pyridium) 100 mg tablet Take by mouth.      sertraline (Zoloft) 50 mg tablet TAKE 1 TABLET BY MOUTH EVERY DAY 90 tablet 3    sod chloride-sodium bicarb (Sinus Rinse Starter) nasal rinse Administer into affected nostril(s).      topiramate (Topamax) 200 mg tablet Take 1 tablet (200 mg) by mouth once daily.       No current facility-administered medications on file prior to visit.     No images are attached to the encounter.            Assessment/Plan   Diagnoses and all orders for this visit:  Chest pain, unspecified type  -     ECG 12 Lead  Palpitations  1.   Patient will have stress test performed, chest xray and event monitor  2.  Patient to call for questions or concerns

## 2025-01-23 DIAGNOSIS — F41.9 ANXIETY DISORDER, UNSPECIFIED: ICD-10-CM

## 2025-01-23 RX ORDER — SERTRALINE HYDROCHLORIDE 50 MG/1
50 TABLET, FILM COATED ORAL DAILY
Qty: 90 TABLET | Refills: 3 | Status: SHIPPED | OUTPATIENT
Start: 2025-01-23

## 2025-02-05 ENCOUNTER — APPOINTMENT (OUTPATIENT)
Dept: CARDIOLOGY | Facility: CLINIC | Age: 50
End: 2025-02-05
Payer: COMMERCIAL

## 2025-04-14 ENCOUNTER — OFFICE VISIT (OUTPATIENT)
Dept: PRIMARY CARE | Facility: CLINIC | Age: 50
End: 2025-04-14
Payer: COMMERCIAL

## 2025-04-14 VITALS
DIASTOLIC BLOOD PRESSURE: 80 MMHG | HEART RATE: 67 BPM | BODY MASS INDEX: 24.89 KG/M2 | WEIGHT: 149.4 LBS | HEIGHT: 65 IN | RESPIRATION RATE: 18 BRPM | OXYGEN SATURATION: 97 % | SYSTOLIC BLOOD PRESSURE: 124 MMHG

## 2025-04-14 DIAGNOSIS — J01.00 ACUTE NON-RECURRENT MAXILLARY SINUSITIS: Primary | ICD-10-CM

## 2025-04-14 PROBLEM — Z86.16 HISTORY OF SEVERE ACUTE RESPIRATORY SYNDROME CORONAVIRUS 2 (SARS-COV-2) DISEASE: Status: ACTIVE | Noted: 2025-04-14

## 2025-04-14 PROBLEM — H93.8X9 EAR FULLNESS: Status: RESOLVED | Noted: 2023-08-15 | Resolved: 2025-04-14

## 2025-04-14 PROBLEM — U07.1 COVID-19: Status: RESOLVED | Noted: 2023-08-15 | Resolved: 2025-04-14

## 2025-04-14 PROBLEM — R30.0 DYSURIA: Status: RESOLVED | Noted: 2023-08-15 | Resolved: 2025-04-14

## 2025-04-14 PROBLEM — G47.00 INSOMNIA: Status: ACTIVE | Noted: 2025-04-14

## 2025-04-14 PROBLEM — R07.81 RIB PAIN: Status: ACTIVE | Noted: 2025-04-14

## 2025-04-14 PROCEDURE — 1036F TOBACCO NON-USER: CPT | Performed by: NURSE PRACTITIONER

## 2025-04-14 PROCEDURE — 99214 OFFICE O/P EST MOD 30 MIN: CPT | Performed by: NURSE PRACTITIONER

## 2025-04-14 PROCEDURE — 3008F BODY MASS INDEX DOCD: CPT | Performed by: NURSE PRACTITIONER

## 2025-04-14 RX ORDER — AMOXICILLIN AND CLAVULANATE POTASSIUM 875; 125 MG/1; MG/1
875 TABLET, FILM COATED ORAL 2 TIMES DAILY
Qty: 14 TABLET | Refills: 0 | Status: SHIPPED | OUTPATIENT
Start: 2025-04-14 | End: 2025-04-21

## 2025-04-14 ASSESSMENT — COLUMBIA-SUICIDE SEVERITY RATING SCALE - C-SSRS
1. IN THE PAST MONTH, HAVE YOU WISHED YOU WERE DEAD OR WISHED YOU COULD GO TO SLEEP AND NOT WAKE UP?: NO
6. HAVE YOU EVER DONE ANYTHING, STARTED TO DO ANYTHING, OR PREPARED TO DO ANYTHING TO END YOUR LIFE?: NO
2. HAVE YOU ACTUALLY HAD ANY THOUGHTS OF KILLING YOURSELF?: NO

## 2025-04-14 ASSESSMENT — PATIENT HEALTH QUESTIONNAIRE - PHQ9
2. FEELING DOWN, DEPRESSED OR HOPELESS: NOT AT ALL
1. LITTLE INTEREST OR PLEASURE IN DOING THINGS: NOT AT ALL
SUM OF ALL RESPONSES TO PHQ9 QUESTIONS 1 AND 2: 0

## 2025-04-14 NOTE — PROGRESS NOTES
"Subjective   Patient ID: Lisa Alvarado is a 49 y.o. female who presents for Cough and Headache (Tooth pain - 9 days).    HPI   9 days ago patient had a sore throat and a left ear ache that lasted for a couple days. A dry cough developed a week ago. The cough is bad during the day and worse at night. She reports some yellow sputum with cough. She has had a continuous headache for the past 4 days. Today her teeth hurt. She denies fevers, but reports fatigue. She took home tests for influenza A, influenza B, and covid and they were all negative. She has some post nasal drainage. She uses warm water and saline flush daily to help avoid sinus infections. She takes zyrtec daily. She takes nyquil nightly, but that hasn't helped. She tried edilma seltzer and tylenol severe sinus, but they didn't help.     Review of Systems  ROS negative except as noted above in HPI.      Objective   /80 (BP Location: Left arm, Patient Position: Sitting, BP Cuff Size: Adult)   Pulse 67   Resp 18   Ht 1.657 m (5' 5.24\")   Wt 67.8 kg (149 lb 6.4 oz)   SpO2 97%   BMI 24.68 kg/m²     Physical Exam  General: Alert and oriented, in no acute distress. Appears stated age, well-nourished, and well hydrated  HEENT:  - Head: Normocephalic and atraumatic, tenderness with maxillary palpation   - Eyes: EOMI, PERRLA  - ENT: Hearing grossly intact. Mucus membranes pink and moist without lesions. Tonsils present without swelling or exudates. Good dentition. TMs gray  Neck: Supple. No stiffness.   Heart: RRR. No murmurs, clicks, or rubs  Lungs: Unlabored breathing. CTAB with no crackles, wheezes, or rhonchi  Musculoskeletal: ROM intact.   Neurological: Alert and oriented. No gross neurological deficits.   Psychological: Appropriate mood and affect  Skin: No rash, abnormal lesions, cyanosis, or erythema      Assessment/Plan   Diagnoses and all orders for this visit:  Acute non-recurrent maxillary sinusitis  -     amoxicillin-pot clavulanate (Augmentin) " 875-125 mg tablet; Take 1 tablet (875 mg) by mouth 2 times a day for 7 days.  -     recommend Afrin for three days   -     recommend Flonase     Patient will FU in office if symptoms don't resolve by the end of the week.    Ceci Ponce RN   [facility] FNP Student

## 2025-04-14 NOTE — PROGRESS NOTES
I was present with the APRN student who participated in the documentation of this note. I have personally seen and re-examined the patient and performed the medical decision-making components (assessment and plan of care). I have reviewed the APRN student documentation and verified the findings in the note as written with additions or exceptions as stated in the body of this note.    [place], APRN-CNP

## 2025-04-29 DIAGNOSIS — J01.00 ACUTE NON-RECURRENT MAXILLARY SINUSITIS: Primary | ICD-10-CM

## 2025-04-29 RX ORDER — AZITHROMYCIN 250 MG/1
TABLET, FILM COATED ORAL
Qty: 6 TABLET | Refills: 0 | Status: SHIPPED | OUTPATIENT
Start: 2025-04-29

## 2025-07-22 DIAGNOSIS — R53.83 OTHER FATIGUE: ICD-10-CM

## 2025-07-22 DIAGNOSIS — Z00.00 HEALTHCARE MAINTENANCE: ICD-10-CM

## 2025-08-20 ENCOUNTER — TELEMEDICINE (OUTPATIENT)
Dept: PRIMARY CARE | Facility: CLINIC | Age: 50
End: 2025-08-20
Payer: COMMERCIAL

## 2025-08-20 DIAGNOSIS — D18.02 CAVERNOUS HEMANGIOMA OF BRAIN (MULTI): ICD-10-CM

## 2025-08-20 DIAGNOSIS — A09 TRAVELER'S DIARRHEA: Primary | ICD-10-CM

## 2025-08-20 PROCEDURE — 99214 OFFICE O/P EST MOD 30 MIN: CPT | Performed by: FAMILY MEDICINE

## 2025-08-20 RX ORDER — CEPHALEXIN 500 MG/1
500 CAPSULE ORAL 2 TIMES DAILY
Qty: 20 CAPSULE | Refills: 0 | Status: SHIPPED | OUTPATIENT
Start: 2025-08-20 | End: 2025-08-30

## 2025-08-20 ASSESSMENT — ENCOUNTER SYMPTOMS
DIARRHEA: 1
CHEST TIGHTNESS: 0
HEADACHES: 0
COUGH: 0
CHOKING: 0
FEVER: 0
DIZZINESS: 0
ACTIVITY CHANGE: 0
FACIAL ASYMMETRY: 0
PALPITATIONS: 0
ARTHRALGIAS: 0
OCCASIONAL FEELINGS OF UNSTEADINESS: 0
COLOR CHANGE: 0
LOSS OF SENSATION IN FEET: 0
FATIGUE: 0
DEPRESSION: 0
LIGHT-HEADEDNESS: 0
BACK PAIN: 0
APPETITE CHANGE: 0

## 2025-08-20 ASSESSMENT — PATIENT HEALTH QUESTIONNAIRE - PHQ9
1. LITTLE INTEREST OR PLEASURE IN DOING THINGS: NOT AT ALL
2. FEELING DOWN, DEPRESSED OR HOPELESS: NOT AT ALL
SUM OF ALL RESPONSES TO PHQ9 QUESTIONS 1 AND 2: 0

## 2025-08-22 DIAGNOSIS — R19.5 LOOSE STOOLS: ICD-10-CM

## 2025-10-03 ENCOUNTER — APPOINTMENT (OUTPATIENT)
Dept: PRIMARY CARE | Facility: CLINIC | Age: 50
End: 2025-10-03
Payer: COMMERCIAL